# Patient Record
Sex: MALE | Race: OTHER
[De-identification: names, ages, dates, MRNs, and addresses within clinical notes are randomized per-mention and may not be internally consistent; named-entity substitution may affect disease eponyms.]

---

## 2020-09-05 ENCOUNTER — HOSPITAL ENCOUNTER (EMERGENCY)
Dept: HOSPITAL 8 - ED | Age: 30
Discharge: HOME | End: 2020-09-05
Payer: COMMERCIAL

## 2020-09-05 VITALS — WEIGHT: 209.44 LBS | BODY MASS INDEX: 32.87 KG/M2 | HEIGHT: 67 IN

## 2020-09-05 VITALS — SYSTOLIC BLOOD PRESSURE: 124 MMHG | DIASTOLIC BLOOD PRESSURE: 74 MMHG
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
## 2020-09-05 DIAGNOSIS — T31.0: ICD-10-CM

## 2020-09-05 DIAGNOSIS — T23.122A: ICD-10-CM
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
## 2020-09-05 DIAGNOSIS — T23.222A: ICD-10-CM
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
## 2020-09-05 DIAGNOSIS — T23.171A: ICD-10-CM

## 2020-09-05 DIAGNOSIS — T23.291A: Primary | ICD-10-CM

## 2020-09-05 PROCEDURE — 16020 DRESS/DEBRID P-THICK BURN S: CPT

## 2020-09-05 PROCEDURE — 99282 EMERGENCY DEPT VISIT SF MDM: CPT
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
